# Patient Record
Sex: MALE | Race: WHITE | NOT HISPANIC OR LATINO | Employment: OTHER | URBAN - METROPOLITAN AREA
[De-identification: names, ages, dates, MRNs, and addresses within clinical notes are randomized per-mention and may not be internally consistent; named-entity substitution may affect disease eponyms.]

---

## 2017-08-11 RX ORDER — CLOPIDOGREL BISULFATE 75 MG/1
TABLET ORAL
Qty: 90 TABLET | Refills: 1 | Status: SHIPPED | OUTPATIENT
Start: 2017-08-11 | End: 2018-02-01 | Stop reason: SDUPTHER

## 2018-01-26 PROBLEM — R53.1 WEAKNESS GENERALIZED: Status: ACTIVE | Noted: 2018-01-26

## 2018-01-26 PROBLEM — I63.9 CVA (CEREBRAL VASCULAR ACCIDENT): Status: ACTIVE | Noted: 2018-01-26

## 2018-02-02 RX ORDER — CLOPIDOGREL BISULFATE 75 MG/1
TABLET ORAL
Qty: 90 TABLET | Refills: 1 | Status: SHIPPED | OUTPATIENT
Start: 2018-02-02 | End: 2018-07-29 | Stop reason: SDUPTHER

## 2018-02-28 PROBLEM — S72.001A CLOSED FRACTURE OF NECK OF RIGHT FEMUR: Status: ACTIVE | Noted: 2018-02-28

## 2018-03-01 PROBLEM — D72.829 LEUKOCYTOSIS: Status: ACTIVE | Noted: 2018-03-01

## 2018-03-02 PROBLEM — S72.001D HIP FRACTURE, RIGHT, CLOSED, WITH ROUTINE HEALING, SUBSEQUENT ENCOUNTER: Status: ACTIVE | Noted: 2018-03-02

## 2018-03-09 PROBLEM — R31.0 HEMATURIA, GROSS: Status: ACTIVE | Noted: 2018-03-09

## 2018-04-10 PROBLEM — D50.9 IRON DEFICIENCY ANEMIA: Chronic | Status: ACTIVE | Noted: 2018-04-10

## 2018-04-10 PROBLEM — E78.00 HYPERCHOLESTEREMIA: Chronic | Status: ACTIVE | Noted: 2018-04-10

## 2018-04-10 PROBLEM — I10 ESSENTIAL HYPERTENSION: Chronic | Status: ACTIVE | Noted: 2018-04-10

## 2018-04-10 PROBLEM — R33.9 URINE RETENTION: Chronic | Status: ACTIVE | Noted: 2018-04-10

## 2018-04-30 PROBLEM — R33.9 URINE RETENTION: Chronic | Status: RESOLVED | Noted: 2018-04-10 | Resolved: 2018-04-30

## 2018-06-11 PROBLEM — M62.521 ATROPHY OF MUSCLE OF RIGHT UPPER ARM: Status: ACTIVE | Noted: 2018-06-11

## 2018-06-11 PROBLEM — M62.531 ATROPHY OF MUSCLE OF RIGHT FOREARM: Status: ACTIVE | Noted: 2018-06-11

## 2018-07-23 PROBLEM — I69.959 HEMIPARESIS AS LATE EFFECT OF CEREBROVASCULAR DISEASE: Chronic | Status: ACTIVE | Noted: 2018-07-23

## 2018-07-23 PROBLEM — Z86.73 H/O: CVA (CEREBROVASCULAR ACCIDENT): Chronic | Status: ACTIVE | Noted: 2018-07-23

## 2018-07-30 RX ORDER — CLOPIDOGREL BISULFATE 75 MG/1
TABLET ORAL
Qty: 90 TABLET | Refills: 1 | Status: SHIPPED | OUTPATIENT
Start: 2018-07-30 | End: 2019-01-30 | Stop reason: SDUPTHER

## 2018-09-12 PROBLEM — M62.521 ATROPHY OF MUSCLE OF RIGHT UPPER ARM: Status: RESOLVED | Noted: 2018-06-11 | Resolved: 2018-09-12

## 2018-09-12 PROBLEM — M62.531 ATROPHY OF MUSCLE OF RIGHT FOREARM: Status: RESOLVED | Noted: 2018-06-11 | Resolved: 2018-09-12

## 2018-10-15 PROBLEM — R47.1 DYSARTHRIA: Status: ACTIVE | Noted: 2017-11-30

## 2019-01-30 RX ORDER — CLOPIDOGREL BISULFATE 75 MG/1
TABLET ORAL
Qty: 90 TABLET | Refills: 1 | Status: SHIPPED | OUTPATIENT
Start: 2019-01-30 | End: 2019-07-24 | Stop reason: SDUPTHER

## 2019-02-12 PROBLEM — R13.10 DYSPHAGIA: Status: ACTIVE | Noted: 2019-02-12

## 2019-05-02 PROBLEM — I80.8 THROMBOPHLEBITIS ARM: Status: ACTIVE | Noted: 2019-05-02

## 2019-05-02 PROBLEM — L03.113 CELLULITIS OF RIGHT UPPER EXTREMITY: Status: ACTIVE | Noted: 2019-05-02

## 2019-05-02 PROBLEM — I82.A11 ACUTE DEEP VEIN THROMBOSIS (DVT) OF AXILLARY VEIN OF RIGHT UPPER EXTREMITY: Status: ACTIVE | Noted: 2019-05-02

## 2019-07-24 RX ORDER — CLOPIDOGREL BISULFATE 75 MG/1
TABLET ORAL
Qty: 90 TABLET | Refills: 1 | Status: ON HOLD | OUTPATIENT
Start: 2019-07-24 | End: 2019-12-20 | Stop reason: HOSPADM

## 2019-12-09 PROBLEM — K56.2 CECAL VOLVULUS: Status: ACTIVE | Noted: 2019-12-09

## 2019-12-10 PROBLEM — N17.9 AKI (ACUTE KIDNEY INJURY): Status: ACTIVE | Noted: 2019-12-10

## 2019-12-11 PROBLEM — S22.39XA RIB FRACTURE: Status: ACTIVE | Noted: 2019-12-11

## 2019-12-12 PROBLEM — D62 ACUTE BLOOD LOSS ANEMIA: Status: ACTIVE | Noted: 2019-12-12

## 2019-12-12 PROBLEM — Z98.890 S/P EXPLORATORY LAPAROTOMY: Status: ACTIVE | Noted: 2019-12-12

## 2019-12-15 PROBLEM — S22.41XA CLOSED FRACTURE OF MULTIPLE RIBS OF RIGHT SIDE: Status: ACTIVE | Noted: 2019-12-11

## 2019-12-18 PROBLEM — N17.9 AKI (ACUTE KIDNEY INJURY): Status: RESOLVED | Noted: 2019-12-10 | Resolved: 2019-12-18

## 2019-12-31 ENCOUNTER — OFFICE VISIT (OUTPATIENT)
Dept: SURGERY | Facility: CLINIC | Age: 84
End: 2019-12-31
Payer: COMMERCIAL

## 2019-12-31 VITALS
SYSTOLIC BLOOD PRESSURE: 118 MMHG | WEIGHT: 167.56 LBS | HEART RATE: 69 BPM | OXYGEN SATURATION: 97 % | TEMPERATURE: 98 F | RESPIRATION RATE: 16 BRPM | DIASTOLIC BLOOD PRESSURE: 68 MMHG | BODY MASS INDEX: 27.04 KG/M2

## 2019-12-31 DIAGNOSIS — Z98.890 POST-OPERATIVE STATE: Primary | ICD-10-CM

## 2019-12-31 PROCEDURE — 99024 POSTOP FOLLOW-UP VISIT: CPT | Mod: S$GLB,,, | Performed by: SURGERY

## 2019-12-31 PROCEDURE — 99024 PR POST-OP FOLLOW-UP VISIT: ICD-10-PCS | Mod: S$GLB,,, | Performed by: SURGERY

## 2019-12-31 PROCEDURE — 99999 PR PBB SHADOW E&M-EST. PATIENT-LVL III: ICD-10-PCS | Mod: PBBFAC,,, | Performed by: SURGERY

## 2019-12-31 PROCEDURE — 99999 PR PBB SHADOW E&M-EST. PATIENT-LVL III: CPT | Mod: PBBFAC,,, | Performed by: SURGERY

## 2019-12-31 RX ORDER — HYDROCODONE BITARTRATE AND ACETAMINOPHEN 10; 325 MG/1; MG/1
TABLET ORAL
COMMUNITY
Start: 2019-12-26 | End: 2020-02-18

## 2019-12-31 RX ORDER — MUPIROCIN 20 MG/G
OINTMENT TOPICAL
COMMUNITY
Start: 2019-12-23 | End: 2020-08-19

## 2019-12-31 RX ORDER — LISINOPRIL 2.5 MG/1
TABLET ORAL
COMMUNITY
Start: 2019-12-23 | End: 2020-02-18 | Stop reason: DRUGHIGH

## 2020-01-13 NOTE — PROGRESS NOTES
Oliverio Donovan Jr is a 86 y.o. male patient.   Two weeks status post exploratory laparotomy, colon resection for cecal volvulus.  Patient doing well.  Bowel movements are normal now.  Tolerating diet.  Incision is well healed.    No diagnosis found.  Past Medical History:   Diagnosis Date    CVA (cerebral vascular accident)     pt with residual right sided weakness    Hip fracture 02/28/2018    Hyperlipidemia     Hypertension     Posterior capsular opacification, left 8/16/2013    Stroke      Past Surgical History Pertinent Negatives:   Procedure Date Noted    CORNEAL TRANSPLANT 08/08/2013    RETINAL DETACHMENT SURGERY 08/08/2013    STRABISMUS SURGERY 08/08/2013     Scheduled Meds:  Continuous Infusions:  PRN Meds:    Review of patient's allergies indicates:  No Known Allergies  There are no hospital problems to display for this patient.    Blood pressure 118/68, pulse 69, temperature 97.8 °F (36.6 °C), resp. rate 16, weight 76 kg (167 lb 8.8 oz), SpO2 97 %.    Subjective:   Diet: Adequate intake.  Patient reports no nausea.    Activity level: Returning to normal.    Pain control: Well controlled.      Objective:  Vital signs (most recent): Blood pressure 118/68, pulse 69, temperature 97.8 °F (36.6 °C), resp. rate 16, weight 76 kg (167 lb 8.8 oz), SpO2 97 %.  General appearance: Comfortable.    Lungs:  Normal effort.    Heart: Normal rate.    Abdomen: Abdomen is soft.    Bowel sounds:  Bowel sounds are normal.    Wound:  Clean.    Extremities: There is normal range of motion.    Neurological: The patient is alert.       Assessment:   Condition: In stable condition.        Status post colon resection for cecal volvulus.  Return to clinic palexandria Childers MD  1/13/2020

## 2020-05-19 PROBLEM — D72.829 LEUKOCYTOSIS: Status: RESOLVED | Noted: 2018-03-01 | Resolved: 2020-05-19

## 2020-05-19 PROBLEM — L03.113 CELLULITIS OF RIGHT UPPER EXTREMITY: Status: RESOLVED | Noted: 2019-05-02 | Resolved: 2020-05-19

## 2020-05-19 PROBLEM — R53.1 WEAKNESS GENERALIZED: Status: RESOLVED | Noted: 2018-01-26 | Resolved: 2020-05-19

## 2020-05-19 PROBLEM — I80.8 THROMBOPHLEBITIS ARM: Status: RESOLVED | Noted: 2019-05-02 | Resolved: 2020-05-19

## 2020-05-19 PROBLEM — D62 ACUTE BLOOD LOSS ANEMIA: Status: RESOLVED | Noted: 2019-12-12 | Resolved: 2020-05-19

## 2020-09-23 PROBLEM — R29.90 EPISODE OF TRANSIENT NEUROLOGIC SYMPTOMS: Status: ACTIVE | Noted: 2020-09-23

## 2020-09-25 ENCOUNTER — PATIENT OUTREACH (OUTPATIENT)
Dept: ADMINISTRATIVE | Facility: CLINIC | Age: 85
End: 2020-09-25

## 2020-09-25 PROBLEM — E11.9 NON-INSULIN DEPENDENT TYPE 2 DIABETES MELLITUS: Chronic | Status: ACTIVE | Noted: 2020-09-25

## 2020-09-25 NOTE — PATIENT INSTRUCTIONS
Discharge Instructions for Stroke  You have been diagnosed with a stroke, or with a TIA (transient ischemic attack). Or you have been identified as having a high risk for stroke. During a stroke, blood stops flowing to part of your brain. This can damage areas in the brain that control other parts of the body. Symptoms after a stroke depend on which part of the brain has been affected.  Stroke risk factors  Once youve had a stroke, youre at greater risk for another one. Listed below are some other factors that can increase your risk for a stroke:  · High blood pressure  · High cholesterol  · Cigarette or cigar smoking  · Diabetes  · Carotid or other artery disease  · Atrial fibrillation, atrial flutter, or other heart disease  · Not being physically active  · Obesity  · Certain blood disorders (such as sickle cell anemia)  · Excessive alcohol use  · Abuse of street drugs  · Race  · Gender  · Family history of stroke  · Diet high in salty, fried, or greasy foods  Changes in daily living  Doing your regular tasks may be difficult after youve had a stroke, but you can learn new ways to manage your daily activities. In fact, doing daily activities may help you to regain muscle strength and bring back function to affected limbs. Be patient, give yourself time to adjust, and appreciate the progress you make.  Daily activities  You may be at risk of falling. Make changes to your home to help you walk more easily. A therapist will decide if you need an assistive device to walk safely.  You may need to see an occupational therapist or physical therapist to learn new ways of doing things. For example, you may need to make adjustments when bathing or dressing:  Tips for showering or bathing  · Test the water temperature with a hand or foot that was not affected by the stroke.  · Use grab bars, a shower seat, a hand-held showerhead, and a long-handled brush.  Tips for getting dressed  · Dress while sitting, starting with  the affected side or limb.  · Wear shirts that pull easily over your head. Wear pants or skirts with elastic waistbands.  · Use zippers with loops attached to the pull tabs.  Lifestyle changes  · Take your medicines exactly as directed. Dont skip doses.  · Begin an exercise program. Ask your provider how to get started. Also ask how much activity you should try to get on a daily or weekly basis. You can benefit from simple activities such as walking or gardening.  · Limit alcohol intake. Men should have no more than 2 alcoholic drinks a day. Women should limit themselves to 1 alcoholic drink per day.  · Know your cholesterol level. Follow your providers recommendations about how to keep cholesterol under control.  · If you are a smoker, quit now. Join a stop-smoking program to improve your chances of success. Ask your provider about medicines or other methods to help you quit.  · Learn stress management techniques to help you deal with stress in your home and work life.  Diet  Your healthcare provider will give you information on dietary changes that you may need to make, based on your situation. Your provider may recommend that you see a registered dietitian for help with diet changes. Changes may include:  · Reducing fat and cholesterol intake  · Reducing salt (sodium) intake, especially if you have high blood pressure  · Eating more fresh vegetables and fruits  · Eating more lean proteins, such as fish, poultry, and beans and peas (legumes)  · Eating less red meat and processed meats  · Using low-fat dairy products  · Limiting vegetable oils and nut oils  · Limiting sweets and processed foods such as chips, cookies, and baked goods  Follow-up care  · Keep your medical appointments. Close follow-up is important to stroke rehabilitation and recovery.  · Some medicines require blood tests to check for progress or problems. Keep follow-up appointments for any blood tests ordered by your providers.  When to call  911  Call 911 right away if you have any of the following symptoms of stroke:  · Weakness, tingling, or loss of feeling on one side of your face or body  · Sudden double vision or trouble seeing in one or both eyes  · Sudden trouble talking or slurred speech  · Trouble understanding others  · Sudden, severe headache  · Dizziness, loss of balance, or a sense of falling  · Blackouts or seizures      F.A.S.T. is an easy way to remember the signs of stroke. When you see these signs, you know that you need to call 911 fast.  F.A.S.T. stands for:  · F is for face drooping. One side of the face is drooping or numb. When the person smiles, the smile is uneven.  · A is for arm weakness. One arm is weak or numb. When the person lifts both arms at the same time, one arm may drift downward.  · S is for speech difficulty. You may notice slurred speech or trouble speaking. The person can't repeat a simple sentence correctly when asked.  · T is for time to call 911. If someone shows any of these symptoms, even if they go away, call 911 immediately. Make note of the time the symptoms first appeared.  Date Last Reviewed: 8/26/2015 © 2000-2017 [employer]. [address] [place] [address], [place] [address]. All rights reserved. This information is not intended as a substitute for professional medical care. Always follow your healthcare professional's instructions.

## 2020-10-14 ENCOUNTER — EXTERNAL HOME HEALTH (OUTPATIENT)
Dept: HOME HEALTH SERVICES | Facility: HOSPITAL | Age: 85
End: 2020-10-14

## 2020-11-11 ENCOUNTER — DOCUMENT SCAN (OUTPATIENT)
Dept: HOME HEALTH SERVICES | Facility: HOSPITAL | Age: 85
End: 2020-11-11

## 2020-11-14 ENCOUNTER — DOCUMENT SCAN (OUTPATIENT)
Dept: HOME HEALTH SERVICES | Facility: HOSPITAL | Age: 85
End: 2020-11-14

## 2021-01-16 ENCOUNTER — IMMUNIZATION (OUTPATIENT)
Dept: PRIMARY CARE CLINIC | Facility: CLINIC | Age: 86
End: 2021-01-16
Payer: COMMERCIAL

## 2021-01-16 DIAGNOSIS — Z23 NEED FOR VACCINATION: Primary | ICD-10-CM

## 2021-01-16 PROCEDURE — 91300 COVID-19, MRNA, LNP-S, PF, 30 MCG/0.3 ML DOSE VACCINE: CPT | Mod: PBBFAC | Performed by: EMERGENCY MEDICINE

## 2021-02-06 ENCOUNTER — IMMUNIZATION (OUTPATIENT)
Dept: PRIMARY CARE CLINIC | Facility: CLINIC | Age: 86
End: 2021-02-06
Payer: COMMERCIAL

## 2021-02-06 DIAGNOSIS — Z23 NEED FOR VACCINATION: Primary | ICD-10-CM

## 2021-02-06 PROCEDURE — 0002A COVID-19, MRNA, LNP-S, PF, 30 MCG/0.3 ML DOSE VACCINE: CPT | Mod: PBBFAC | Performed by: FAMILY MEDICINE

## 2021-02-06 PROCEDURE — 91300 COVID-19, MRNA, LNP-S, PF, 30 MCG/0.3 ML DOSE VACCINE: CPT | Mod: PBBFAC | Performed by: FAMILY MEDICINE

## 2021-09-21 DIAGNOSIS — I73.9 PAD (PERIPHERAL ARTERY DISEASE): Primary | ICD-10-CM

## 2022-12-13 ENCOUNTER — DOCUMENT SCAN (OUTPATIENT)
Dept: HOME HEALTH SERVICES | Facility: HOSPITAL | Age: 87
End: 2022-12-13
Payer: COMMERCIAL

## 2024-03-12 ENCOUNTER — HOSPITAL ENCOUNTER (OUTPATIENT)
Dept: RADIOLOGY | Facility: HOSPITAL | Age: 89
Discharge: HOME | End: 2024-03-12
Attending: NURSE PRACTITIONER
Payer: MEDICARE

## 2024-03-12 ENCOUNTER — CLINICAL SUPPORT (OUTPATIENT)
Dept: REHABILITATION | Facility: HOSPITAL | Age: 89
End: 2024-03-12
Attending: NURSE PRACTITIONER
Payer: MEDICARE
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
## 2024-03-12 DIAGNOSIS — T17.320A CHOKING DUE TO FOOD IN LARYNX, INITIAL ENCOUNTER: ICD-10-CM

## 2024-03-12 PROCEDURE — 74230 X-RAY XM SWLNG FUNCJ C+: CPT | Mod: TC

## 2024-03-12 PROCEDURE — 92610 EVALUATE SWALLOWING FUNCTION: CPT | Mod: PN

## 2024-03-12 PROCEDURE — 25500020 PHARM REV CODE 255: Performed by: NURSE PRACTITIONER

## 2024-03-12 PROCEDURE — A9698 NON-RAD CONTRAST MATERIALNOC: HCPCS | Performed by: NURSE PRACTITIONER

## 2024-03-12 PROCEDURE — 92611 MOTION FLUOROSCOPY/SWALLOW: CPT | Mod: PN

## 2024-03-12 PROCEDURE — 74230 X-RAY XM SWLNG FUNCJ C+: CPT | Mod: 26,,, | Performed by: RADIOLOGY

## 2024-03-12 RX ADMIN — BARIUM SULFATE 30 ML: 980 POWDER, FOR SUSPENSION ORAL at 01:03

## 2024-03-12 RX ADMIN — BARIUM SULFATE 148 ML: 0.81 POWDER, FOR SUSPENSION ORAL at 01:03

## 2024-03-18 NOTE — PLAN OF CARE
[facility]  MODIFIED BARIUM SWALLOW STUDY      Date: 3/12/2024     Name: Oliverio Donovan Jr.   MRN: [id number]    Therapy Diagnosis: Mild Oropharyngeal Dysphagia    Physician: Lainey Suarez NP  Physician Orders: SLP Video Swallow  Medical Diagnosis from Referral: Choking due to food in larynx, initial encounter [T17.320A, W44.F3XA]     Date of Evaluation:  3/12/2024     Time In:  1310  Time Out:  1350  Total Billable Time: 40     Procedure Min.   Swallow and Oral Function Evaluation   20   Fl Modified Barium Swallow Speech  20     Precautions: Standard, Fall, Communication, and Hearing    Subjective   Date of Onset: Within the past year    Past Medical History: Oliverio Donovan Jr.  has a past medical history of Arthritis, CVA (cerebral vascular accident), Hip fracture (02/28/2018), Hyperlipidemia, Hypertension, Posterior capsular opacification, left (8/16/2013), and Stroke.  Oliverio Donovan Jr.  has a past surgical history that includes YAG Cap OS; Cataract extraction w/  intraocular lens implant (Bilateral); Hip pinning (Right, 03/02/2018); right rotator cuff; Neck surgery; Cholecystectomy; Hernia repair; Hemorrhoid surgery; Exploratory laparotomy (N/A, 12/10/2019); and Colectomy (N/A, 12/10/2019).    The patient is a 89 y.o. male. Daughter was present to report history due to patient's hearing status and decreased intelligibility of speech. Daughter reports the patient coughs up liquids and coughs while eating food.  Denies difficulty swallowing pills. Denies unintentional weight loss. Denies previous h/o GERD.      -Current diet at home: Thi liquids, and regular solid food consistencies  -Recommended diet from previous study: Last Modified Barium Swallow Study completed in 2019  -Therapy received: Previous speech therapy following stroke to address speech and language deficits    In consideration of the interrelationships between body systems and swallowing, History was provided  "by patient, family, and/or taken from chart review. The following are medical conditions present in the patient's history which can result in or be attributed to dysphagia:  Respiratory None noted in this category   Cardiovascular Essential hypertension  Hypercholesteremia   Digestive None noted in this category   Infections  None noted in this category   Urinary None noted in this category   Endocrine Diabetes   Nervous Hemiparesis as late effect of cerebrovascular disease  CVA (cerebral vascular accident)  Dysarthria  Episode of transient neurologic symptoms   Skeletal None noted in this category   Immune None noted in this category   Cancer None noted in this category   Psychiatric  None noted in this category   Congenital  None noted in this category   Other None noted in this category     The following observations were made:   -Mental status: Alert and Cooperative  -Factors affecting performance:  Patient hard-of-hearing, not aided. Patient with difficulty following all compensatory cues  -Feeding Method: needs some assistance due to right-side weakness    Respiratory Status:   -Respiratory Status: room air    Medical Hx and Allergies:  Review of patient's allergies indicates:  No Known Allergies    Pain Scale:  0/10 on VAS currently.   Pain Location: No pain indicated throughout study.    Objective     Modified Barium Swallow Study  Purpose: to evaluate anatomy and physiology of the oropharyngeal swallow, to determine effectiveness of rehabilitation strategies, and to determine diet consistency and intervention recommendations. The study was performed using the "Gold Standard" of 30 fps with as low as reasonably achievable (ALARA) exposure.     The patient was seen in radiology seated in High Díaz's position in a video imaging chair for lateral views of the larynx and an A/P view. The study was conducted using Varibar thin liquid (IDDSI 0), Varibar nectar liquid (IDDSI 2), Varibar pudding (IDDSI 4), Peaches " covered in Varibar powder (IDDSI 6/2), and solid coated in Varibar pudding (IDDSI 7). He tolerated the procedure well.     A cranial nerve examination revealed the following:  Cranial Nerve Examination  Cranial Nerve 5: Trigeminal Nerve  Motor Jaw Posture at rest: Open  Mandible Elevation/Depression: WFL  Mandible lateralization:  Decreased ROM   Abnormal movement: absent Interpretation: Cannot rule out possible cranial nerve involvement    Sensory Forehead: WFL  Cheek: WFL  Jaw: WFL  Facial Pain: None noted Interpretation: Cannot rule out possible cranial nerve involvement     Cranial Nerve 7: Facial Nerve  Motor Facial Symmetry: eye droop, lip corner droop-right, and asymmetry  Wrinkle Forehead:  Decreased bilaterally  Close eyes tightly: WFL  Labial Protrusion: Decreased strength  Labial Retraction: Decreased ROM  Buccal Strength with Labial Seal: Reduced  Abnormal movement: absent Interpretation: Cannot rule out possible cranial nerve involvement    Sensory Formal testing not completed. Pt denied any changes in taste      Cranial Nerves IX and X: Glossopharyngeal and Vagus Nerves  Motor Palatal Symmetry (Rest): WNL  Palatal Symmetry (Movement): WNL  Cough: Perceptually strong  Voice Prior to PO intake: Clear  Resonance: Normal  Abnormal movement: absent Interpretation: Intact bilaterally     Cranial Nerve XII: Hypoglossal Nerve  Motor Tongue at rest: Atrophy right  Lingual Protrusion: Deviation right  Lingual Protrusion against Resistance: Weakness  Lingual Lateralization: Decreased ROM  Abnormal movement: absent Interpretation: Cannot rule out possible cranial nerve involvement     Other information:  Mucosal Quality: No abnormal findings  Secretion Management: Within normal limits  Dentition: Good condition for speech and mastication       CONSISTENCIES ADMINISTERED:    Consistency  Findings Rosenbeck's Penetration/Aspiration Scale (PAS) Strategy Attempted    Thin (IDDSI 0)    5ml x2  10ml x2  Self-regulated  cup sip x3   Consecutive cup sip x0   Self-regulated straw sip x1   Consecutive straw sip x2 Oral phase:  Trace residue noted on the tongue following 5ml, 10ml x2, self-regulated cup sip x2 which was  reduced by cued effortful  swallow and premature spillage to the pyriform sinus     Pharyngeal phase:  Trace to mild residue noted in the vallecula which was  reduced by cued effortful swallow, Trace residue noted in the pyriform sinuses which was  reduced by consecutive  swallow, and flash penetration noted during the swallow-noted on 5ml x1, 10ml x2, self-regulated cup sip x3, and consecutive straw sip x2    Esophageal screen: delayed emptying/retention of bolus-observed in A & P view  Best: (1) Material does not enter the airway    Worst: (2) Material enters the airway, remains above the vocal folds, and is ejected from the airway  Trace penetration occurred during the swallow- noting gross penetration on 10ml trial x1   Effortful swallow-successful in reducing oral and pharyngeal residue- Multiple swallows per bolus-successful in reducing oral and pharyngeal residue   Nectar thick (mildly thick/IDDSI 2)    5ml x2  10ml x2 Oral phase: WFL and Trace residue noted on the tongue throughout all 4 trials which was successfully  reduced by cued effortful  swallow    Pharyngeal phase: WFL, trace residue noted in the vallecula which was successfully reduced by cued effortful swallow swallow, and no pyriform sinus residue noted     Esophogeal screen: aerophagia   Best: (1) Material does not enter the airway    Worst: (1) Material does not enter the airway Effortful swallow-with successful reduction and/or clearance of lingual and vallecular residue   Puree (extremely thick/ IDDSI 4)    5ml x2  10ml x2   Oral phase:  trace and mild residue noted on the tongue following all trials which was  reduced by cued effortful  swallow and premature spillage to the pyriform sinus and vallecula    Pharyngeal phase:  Trace, mild and  moderate residue noted in the vallecula which was  reduced by cued effortful swallow on trial of 5ml, but not successfully reduced on 10 ml trial x1    Esophageal screen:  Not screened   Best: (1) Material does not enter the airway    Worst: (2) Material enters the airway, remains above the vocal folds, and is ejected from the airway  Trace penetration occurred during the swallow   Effortful swallow-with successful reduction of oral and pharyngeal residue   Mixed consistency (thin/ IDDSI 0 + soft and bite sized/ IDDSI 6)    - 1 peach in juice x0  - 2 peaches in juice x4 Oral phase: WFL and Trace residue noted on the tongue throughout all 4 trials which was successfully  reduced by cued effortful  swallow, and premature spillage to the pyriform sinus and vallecula    Pharyngeal phase:  trace and mild residue noted in the vallecula which wassuccessfully reduced by consecutive swallow swallow and no pyriform sinus residue noted    Esophageal screen:  Not screened Best: (1) Material does not enter the airway      Worst: (2) Material enters the airway, remains above the vocal folds, and is ejected from the airway  Trace penetration occurred during the swallow   No strategies completed or needed   Solid (regular/ IDDSI 7)    - bite of cookie x2 Oral phase: WFL and trace residue noted on the tongue which was successfully reduced by liquid wash swallow    Pharyngeal phase:  Moderate to severe residue noted in the vallecula which was successfully reduced by liquid wash swallow and no pyriform sinus residue noted    Esophageal screen:  Retrograde flow of bolus below the UES observed Best: (1) Material does not enter the airway    Worst: (1) Material does not enter the airway     Liquid wash swallow noted with moderate reduction        Treatment   Total Treatment Time: n/a  Patient educated regarding results and recommendations of the evaluation. See the recommendations section below.    Education: Plan of Care, role of SLP in  care, and anatomy and physiology of swallow mechanism as it relates to MBSS findings and recommendations were discussed with the patient. Patient expressed understanding. All questions were answered.     Assessment     Oliverio Donovan Jr. is a 89 y.o. male referred for Modified Barium Swallow Study with a medical diagnosis of Choking due to food in larynx, initial encounter [T17.320A, W44.F3XA] . The patient presents mild oropharyngeal dysphagia as determined by the Dysphagia Outcome and Severity Scale (GLORIA) the patient is at  Level 5: Mild Dysphagia.    Modified Barium Swallow Study (MBSS) revealed oral phase characterized by reduced lingual and labial strength and range of motion for tongue control, bolus preparation and transport. Lip closure was adequate with no labial escape. Bolus prep and mastication was timely and efficient. Lingual motion was brisk for adequate bolus transport.  Trace oral residue noted across consistencies with successful reduction and/or elimination following effortful swallow  The swallow was initiated when the head of the bolus passed the ramus of the mandible.    Pharyngeal phase characterized by delayed initiation of swallow across consistencies. The soft palate elevated for complete of the velopharyngeal port. During pharyngeal swallow, reduced base of tongue retraction, anterior hyoid excursion, laryngeal elevation, and pharyngeal stripping wave resulted in incomplete epiglottic inversion and UES opening with  Flash penetration. No aspiration was observed in today's study. Patient noted to produce burp following all trials presented, with increase of mucus observed throughout PO trial administration.     On esophageal screen, dysmotility and delayed esophageal emptying were noted. Further esophageal imaging including EGD and/or barium esophagram as well as follow-up with GI is recommended.    Impressions: Patient presents with mild oropharyngeal dysphagia, likely chronic due to h/o  cerbrovascular accident and natural age related changes of swallow function. Despite patient noted with mild oropharyngeal dysphagia, no further speech therapy indicated at this time as patient with appropriate protection of airway and no modification of diet recommended.  In consideration of the Dynamic Imaging Grade of Swallowing Toxicity (DIGEST) (Navjot et al, 2017), patient presents with preserved safety of swallow and mild efficiency impairment. Patient appears to be at low risk for aspiration related PNA in consideration of three pillars of aspiration pneumonia (Ismael, 2005) including his oral health status, overall health/immune status, and laryngeal vestibule closure/severity of dysphagia. However, unable to assess risk related to aspiration pneumonia cause by the aspiration of gastric content.  Behavioral swallow rehabilitation not indicated at this time.     Patient consecutive swallow cues for reduction and/or clearance of residue following initial swallow. Patient with     Functional Oral Intake Scale (FOIS)  The Functional Oral Intake Scale (FOIS) is an ordinal scale that is used to assess the current status and meaningful change in the oral intake. FOIS levels include:    TUBE DEPENDENT (levels 1-3) 1. No oral intake  2. Tube dependent with minimal/inconsistent oral intake  3. Tube supplements with consistent oral intake      TOTAL ORAL INTAKE (levels 4-7) 4. Total oral intake of a single consistency  5. Total oral intake of multiple consistencies requiring special preparation  6. Total oral intake with no special preparation, but must avoid specific foods or liquid items  7. Total oral intake with no restrictions     Patient is currently judged to be at FOIS level 7.      References:  SHANELL Guevara. (2005, March). Pneumonia: Factors Beyond Aspiration. Perspectives in Swallowing and Swallowing Disorders (Dysphagia), 14, 10-16.  Chente KA, Candi MP, Nikhil DA, Cara FERRERK, Anisa HY, Desi RS,  Benito CD, Hank SY, Ajay CP, Jayden J, Lazarus CL, May A, Sandra J, Jono JW, Rambo HM, Daiys JS. Dynamic Imaging Grade of Swallowing Toxicity (DIGEST): Scale development and validation. Cancer. 2017 Jan 1;123(1):62-70. doi: 10.1002/cncr.07918. Epub 2016 Aug 26. PMID: 77298780; PMCID: LNP8455322.    Recommendations:     Consistency Recommendations:  Thin liquids (IDDSI 0) and Regular/Easy to chew solid consistencies (IDDSI 7).  Avoid dry, crumbly foods.  Risk Management: use good oral hygiene , sit upright for all PO intake, increase physical mobility as tolerated, behavioral reflux precautions, alternate bites and sips, and remain upright for at least 1-2 hours following any PO intake  Specialist Referrals: GI  Ancillary Tests: Consider EGD or Barium Esophagram .  Therapy: Dysphagia therapy is not recommended at this time.  Follow-up exam: Follow up swallow study is not indicated at this time.    Please contact [facility] at [phone] if there are questions re: the above or if we can be of additional service to this patient.    Therapist's Name:   Anastasia Gordon M.S., CF-SLP  Speech-Language Pathologist Resident    Date: 3/12/2024

## 2024-03-18 NOTE — PATIENT INSTRUCTIONS
Important note: Dairy products can also increase the likelihood of reflux.                    For more information and purchase options go to: [email or web address]

## 2024-12-23 ENCOUNTER — TELEPHONE (OUTPATIENT)
Dept: OPTOMETRY | Facility: CLINIC | Age: 89
End: 2024-12-23
Payer: MEDICARE

## 2025-05-05 ENCOUNTER — OFFICE VISIT (OUTPATIENT)
Dept: OPTOMETRY | Facility: CLINIC | Age: OVER 89
End: 2025-05-05
Payer: MEDICARE

## 2025-05-05 DIAGNOSIS — E11.9 TYPE 2 DIABETES MELLITUS WITHOUT OPHTHALMIC MANIFESTATIONS: ICD-10-CM

## 2025-05-05 DIAGNOSIS — Z96.1 PSEUDOPHAKIA OF BOTH EYES: ICD-10-CM

## 2025-05-05 DIAGNOSIS — E11.9 NON-INSULIN DEPENDENT TYPE 2 DIABETES MELLITUS: Primary | Chronic | ICD-10-CM

## 2025-05-05 DIAGNOSIS — Z86.73 H/O: CVA (CEREBROVASCULAR ACCIDENT): Chronic | ICD-10-CM

## 2025-05-05 DIAGNOSIS — I10 ESSENTIAL HYPERTENSION: ICD-10-CM

## 2025-05-05 PROCEDURE — 1159F MED LIST DOCD IN RCRD: CPT | Mod: CPTII,S$GLB,, | Performed by: OPTOMETRIST

## 2025-05-05 PROCEDURE — 3288F FALL RISK ASSESSMENT DOCD: CPT | Mod: CPTII,S$GLB,, | Performed by: OPTOMETRIST

## 2025-05-05 PROCEDURE — 99999 PR PBB SHADOW E&M-EST. PATIENT-LVL III: CPT | Mod: PBBFAC,,, | Performed by: OPTOMETRIST

## 2025-05-05 PROCEDURE — 1160F RVW MEDS BY RX/DR IN RCRD: CPT | Mod: CPTII,S$GLB,, | Performed by: OPTOMETRIST

## 2025-05-05 PROCEDURE — 1101F PT FALLS ASSESS-DOCD LE1/YR: CPT | Mod: CPTII,S$GLB,, | Performed by: OPTOMETRIST

## 2025-05-05 PROCEDURE — 2023F DILAT RTA XM W/O RTNOPTHY: CPT | Mod: CPTII,S$GLB,, | Performed by: OPTOMETRIST

## 2025-05-05 PROCEDURE — 92004 COMPRE OPH EXAM NEW PT 1/>: CPT | Mod: S$GLB,,, | Performed by: OPTOMETRIST

## 2025-05-05 PROCEDURE — 1126F AMNT PAIN NOTED NONE PRSNT: CPT | Mod: CPTII,S$GLB,, | Performed by: OPTOMETRIST

## 2025-05-05 NOTE — PROGRESS NOTES
"HPI     Hypertensive Eye Exam     Additional comments: Patient Oliverio "Flaquito" Jr Venus. is a 89 year old   male.           Comments    Pt referred by Dr. Alphonso Suarez for new patient hypertensive eye exam.   Pt's daughter, Ayesha, is present with patient at visit today.    Pt has been nonverbal over the past 12 months due to a stroke that he   suffered a few years ago. Pt shook his head yes when asked if he can see   TV well. Pt shook his head no when asked if his eyes ever hurt.    DLS: 02/03/2016 with Dr. Wilhelm      Meds: None    POHx:  1. Examination of eyes and vision     2. S/P cataract surgery, left       S/p YAG laser OS: 08/16/2013 (Dr. Friedman)     3. S/P cataract surgery, right     4. Pseudophakia of both eyes     5. Transient refractive error                 Last edited by Rossy Harris on 5/5/2025  1:24 PM.            Assessment /Plan     For exam results, see Encounter Report.    Non-insulin dependent type 2 diabetes mellitus  Type 2 diabetes mellitus without ophthalmic manifestations  Essential hypertension  H/O: CVA (cerebrovascular accident)   No retinopathy, monitor yearly      Pseudophakia of both eyes  . S/P cataract surgery, left       S/p YAG laser OS: 08/16/2013 (Dr. Friedman)      S/P cataract surgery, right           RTC 1 year               "